# Patient Record
Sex: MALE | Race: BLACK OR AFRICAN AMERICAN | Employment: FULL TIME | URBAN - METROPOLITAN AREA
[De-identification: names, ages, dates, MRNs, and addresses within clinical notes are randomized per-mention and may not be internally consistent; named-entity substitution may affect disease eponyms.]

---

## 2017-07-21 ENCOUNTER — APPOINTMENT (OUTPATIENT)
Dept: GENERAL RADIOLOGY | Age: 32
End: 2017-07-21
Attending: EMERGENCY MEDICINE
Payer: COMMERCIAL

## 2017-07-21 ENCOUNTER — HOSPITAL ENCOUNTER (OUTPATIENT)
Age: 32
Discharge: HOME | End: 2017-07-21
Attending: EMERGENCY MEDICINE
Payer: COMMERCIAL

## 2017-07-21 VITALS
BODY MASS INDEX: 29.62 KG/M2 | HEART RATE: 75 BPM | TEMPERATURE: 97 F | WEIGHT: 200 LBS | RESPIRATION RATE: 16 BRPM | HEIGHT: 69 IN | DIASTOLIC BLOOD PRESSURE: 56 MMHG | SYSTOLIC BLOOD PRESSURE: 131 MMHG | OXYGEN SATURATION: 96 %

## 2017-07-21 DIAGNOSIS — M43.6 TORTICOLLIS: Primary | ICD-10-CM

## 2017-07-21 DIAGNOSIS — S86.911A KNEE STRAIN, RIGHT, INITIAL ENCOUNTER: ICD-10-CM

## 2017-07-21 DIAGNOSIS — M25.852 LEFT HIP IMPINGEMENT SYNDROME: ICD-10-CM

## 2017-07-21 PROBLEM — M76.899 TENDINITIS OF HIP: Status: ACTIVE | Noted: 2017-07-21

## 2017-07-21 PROCEDURE — 99214 OFFICE O/P EST MOD 30 MIN: CPT

## 2017-07-21 PROCEDURE — 73560 X-RAY EXAM OF KNEE 1 OR 2: CPT | Performed by: EMERGENCY MEDICINE

## 2017-07-21 PROCEDURE — 73502 X-RAY EXAM HIP UNI 2-3 VIEWS: CPT | Performed by: EMERGENCY MEDICINE

## 2017-07-21 RX ORDER — PREDNISONE 20 MG/1
40 TABLET ORAL DAILY
Qty: 10 TABLET | Refills: 0 | Status: SHIPPED | OUTPATIENT
Start: 2017-07-21 | End: 2017-07-26

## 2017-07-21 RX ORDER — METAXALONE 800 MG/1
800 TABLET ORAL EVERY 8 HOURS PRN
Qty: 20 TABLET | Refills: 0 | Status: SHIPPED | OUTPATIENT
Start: 2017-07-21 | End: 2017-08-11 | Stop reason: ALTCHOICE

## 2017-07-21 NOTE — ED PROVIDER NOTES
Patient Seen in: [address]    History   Patient presents with:  Neck Pain (musculoskeletal, neurologic)  Knee Pain: right  Hip Pain: left    Stated Complaint: neck pain; rt knee pain; hip and wrist    HPI    The patient after running on pavement. 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 The patient has taken Aleve for the above symptoms without improvement    History reviewed.  No pertinent past medical medially  Negative drawer sign    Left hip:  Range of motion intact  Tender anterior femoral head  Nontender musculature  Pain with passive and active abduction and external rotation    ED Course   Labs Reviewed - No data to display  X-rays are consistent total) by mouth daily. Qty: 10 tablet Refills: 0    Metaxalone 800 MG Oral Tab  Take 1 tablet (800 mg total) by mouth every 8 (eight) hours as needed for Pain.   Qty: 20 tablet Refills: 0

## 2017-07-21 NOTE — ED NOTES
ACE wrap applied by tech. Pt given discharge instructions and prescriptions, verbalizes understanding.  Pt instructed that muscle relaxants can cause drowsiness and pt is not to drive, drink alcohol, or participate in activities requiring full attention

## 2017-07-21 NOTE — ED NOTES
Pt arrives c/o right knee pain, neck pain, left hip pain. Reports he feels right knee pain is related to playing basketball recently, noted knee pain and swelling. Has tried rest and a warm bath without relief of pain.  Also noted neck pain over the last 2-

## 2017-09-11 ENCOUNTER — HOSPITAL ENCOUNTER (OUTPATIENT)
Age: 32
Discharge: HOME | End: 2017-09-11
Attending: FAMILY MEDICINE
Payer: COMMERCIAL

## 2017-09-11 VITALS
TEMPERATURE: 97 F | HEART RATE: 76 BPM | OXYGEN SATURATION: 100 % | SYSTOLIC BLOOD PRESSURE: 164 MMHG | DIASTOLIC BLOOD PRESSURE: 59 MMHG | HEIGHT: 69 IN | WEIGHT: 200 LBS | RESPIRATION RATE: 22 BRPM | BODY MASS INDEX: 29.62 KG/M2

## 2017-09-11 DIAGNOSIS — R30.0 DYSURIA: Primary | ICD-10-CM

## 2017-09-11 LAB
URINE BILIRUBIN: NEGATIVE
URINE BLOOD: NEGATIVE
URINE CLARITY: CLEAR
URINE COLOR: YELLOW
URINE GLUCOSE: NEGATIVE MG/DL
URINE KETONES: NEGATIVE MG/DL
URINE LEUKOCYTE ESTERASE: NEGATIVE
URINE NITRITE: NEGATIVE
URINE PH: 5.5
URINE PROTEIN: NEGATIVE MG /DL
URINE SPECIFIC GRAVITY: 1.02
URINE UROBILINOGEN: 0.2 MG/DL

## 2017-09-11 PROCEDURE — 87591 N.GONORRHOEAE DNA AMP PROB: CPT | Performed by: FAMILY MEDICINE

## 2017-09-11 PROCEDURE — 99213 OFFICE O/P EST LOW 20 MIN: CPT

## 2017-09-11 PROCEDURE — 99214 OFFICE O/P EST MOD 30 MIN: CPT

## 2017-09-11 PROCEDURE — 81002 URINALYSIS NONAUTO W/O SCOPE: CPT

## 2017-09-11 PROCEDURE — 87491 CHLMYD TRACH DNA AMP PROBE: CPT | Performed by: FAMILY MEDICINE

## 2017-09-11 PROCEDURE — 87086 URINE CULTURE/COLONY COUNT: CPT | Performed by: FAMILY MEDICINE

## 2017-09-11 NOTE — ED INITIAL ASSESSMENT (HPI)
Per pt having stinging with urination also reports right flank pain intermittent for approx 2 months. Pt reports had recent STD testing in July all was negative.

## 2017-09-12 NOTE — ED PROVIDER NOTES
Patient Seen in: [address]    History   Patient presents with:  Urinary Symptoms (urologic)    Stated Complaint: Personal symptoms    HPI    82-year-old male presents with 2 months of intermittent dysuria.   He notes loree intact distal pulses. Pulmonary/Chest: Effort normal and breath sounds normal.   Abdominal: Soft.  Bowel sounds are normal.   Genitourinary:   Genitourinary Comments: Deferred by patient   Neurological: He is alert and oriented to person, place, and time

## 2017-09-12 NOTE — ED NOTES
All orders complete pt leaving IC stable no acute distress noted Pt verbalizes DC and follow up instructions

## 2017-09-13 LAB
C TRACH DNA SPEC QL NAA+PROBE: NEGATIVE
N GONORRHOEA DNA SPEC QL NAA+PROBE: NEGATIVE